# Patient Record
Sex: MALE | Race: WHITE | Employment: FULL TIME | URBAN - METROPOLITAN AREA
[De-identification: names, ages, dates, MRNs, and addresses within clinical notes are randomized per-mention and may not be internally consistent; named-entity substitution may affect disease eponyms.]

---

## 2017-06-30 ENCOUNTER — HOSPITAL ENCOUNTER (OUTPATIENT)
Dept: PHYSICAL THERAPY | Age: 32
Discharge: HOME | End: 2017-06-30
Payer: COMMERCIAL

## 2017-06-30 PROCEDURE — 97530 THERAPEUTIC ACTIVITIES: CPT

## 2017-06-30 PROCEDURE — 97161 PT EVAL LOW COMPLEX 20 MIN: CPT
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
## 2017-07-05 ENCOUNTER — HOSPITAL ENCOUNTER (OUTPATIENT)
Dept: PHYSICAL THERAPY | Age: 32
Discharge: HOME | End: 2017-07-05
Payer: COMMERCIAL

## 2017-07-05 PROCEDURE — 97110 THERAPEUTIC EXERCISES: CPT

## 2017-07-05 PROCEDURE — 97140 MANUAL THERAPY 1/> REGIONS: CPT

## 2017-07-05 PROCEDURE — 97014 ELECTRIC STIMULATION THERAPY: CPT

## 2017-07-05 NOTE — PROGRESS NOTES
PT DAILY TREATMENT NOTE 12-    Patient Name: Fernando Sood  Date:2017  : 1985  [x]  Patient  Verified  Payor: SELF PAY / Plan: [facility] SELF PAY / Product Type: Self Pay /    In time:1800  Out time:1900  Total Treatment Time (min): 60  Visit #: 2 of 12    Treatment Area: Bilateral shoulder pain [M25.511, M25.512]    SUBJECTIVE  Pain Level (0-10 scale): 4-5/10 neck  Any medication changes, allergies to medications, adverse drug reactions, diagnosis change, or new procedure performed?: [x] No    [] Yes (see summary sheet for update)  Subjective functional status/changes:   [] No changes reported  Reviewed eval prior to tx    OBJECTIVE  2  Modality rationale: decrease pain and increase tissue extensibility to improve the patients ability to improve ADL's   Min Type Additional Details   15 [x] Estim:  [x]Unatt       []IFC  [x]Premod  1-10 PPS                      []Other:  []w/ice   [x]w/heat  Position:HL  Location:cervoico-thoracic    [] Estim: []Att    []TENS instruct  []NMES                    []Other:  []w/US   []w/ice   []w/heat  Position:  Location:    []  Traction: [] Cervical       []Lumbar                       [] Prone          []Supine                       []Intermittent   []Continuous Lbs:  [] before manual  [] after manual    []  Ultrasound: []Continuous   [] Pulsed                           []1MHz   []3MHz W/cm2:  Location:    []  Iontophoresis with dexamethasone         Location: [] Take home patch   [] In clinic    []  Ice     []  heat  []  Ice massage  []  Laser   []  Anodyne Position:  Location:    []  Laser with stim  []  Other:  Position:  Location:    []  Vasopneumatic Device Pressure:       [] lo [] med [] hi   Temperature: [] lo [] med [] hi   [x] Skin assessment post-treatment:  [x]intact [x]redness- no adverse reaction    []redness  adverse reaction:      min []Eval                  []Re-Eval       35 min Therapeutic Exercise:  [x] See flow sheet :   Rationale: increase ROM, increase strength and increase proprioception to improve the patients ability to improve ADL's     min Therapeutic Activity:  []  See flow sheet :         min Neuromuscular Re-education:  []  See flow sheet :       10 min Manual Therapy:  SOR. DTM/TPM B UT's   Rationale: decrease pain, increase ROM, increase tissue extensibility and decrease trigger points to improve ADL's     min Gait Training:  ___ feet with ___ device on level surfaces with ___ level of assist   Rationale: With   [] TE   [] TA   [] neuro   [] other: Patient Education: [x] Review HEP    [] Progressed/Changed HEP based on:   [] positioning   [] body mechanics   [] transfers   [x] heat/ice application    [] other:       Other Objective/Functional Measures: see POC     Pain Level (0-10 scale) post treatment: 4/10    ASSESSMENT/Changes in Function:     Patient will continue to benefit from skilled PT services to modify and progress therapeutic interventions, address ROM deficits, address strength deficits, analyze and address soft tissue restrictions, analyze and cue movement patterns, analyze and modify body mechanics/ergonomics and instruct in home and community integration to attain remaining goals. [x]  See Plan of Care  []  See progress note/recertification  []  See Discharge Summary         Progress towards goals / Updated goals:  Short Term Goals: STG- To be accomplished in 2 week(s):  1.  Pt will be independent with HEP to encourage prophylaxis. Eval:dispensed  Current: reviewed stretching      Long Term Goals: LTG- To be accomplished in 6 week(s):  1.  Pt will demonstrate 5/5 shoulder strength with no pain during MMT to allow pt to return to gym activities. Eval:3+ flex, 4- abd  Current: no change      2.  Pt will report pain at worst does not exceed 3/10 with most activities to allow pt to return to work with no restrictions.   Eval:8-9  Current: 4-5/10      3.  Pt cervical spine ROM will improve by 10 deg each direction to allow pt to check mirrors while driving with no increase in pain. Eval:Cervical spine:     Left Right   Flexion 22  -   extension 21  -   rotation 35 50   Side bend 15 20- \"popping\"   Current: no change      4.  Pt FOTO score will increase by 10 points to show improvement in shoulder and neck function.   Eval:72  Current: will address at visit 5      PLAN  [x]  Upgrade activities as tolerated     [x]  Continue plan of care  []  Update interventions per flow sheet       []  Discharge due to:_  []  Other:_      Pascale Graff PTA 7/5/2017  6:13 PM    Future Appointments  Date Time Provider David Stovall   7/7/2017 3:30 PM Renny Norman, PT [facility]   7/12/2017 5:00 PM CHIKA Montgomery [facility]   7/14/2017 3:00 PM CHIKA Montgomery [facility]

## 2017-07-07 ENCOUNTER — HOSPITAL ENCOUNTER (OUTPATIENT)
Dept: PHYSICAL THERAPY | Age: 32
Discharge: HOME | End: 2017-07-07
Payer: COMMERCIAL

## 2017-07-07 PROCEDURE — 97014 ELECTRIC STIMULATION THERAPY: CPT

## 2017-07-07 PROCEDURE — 97110 THERAPEUTIC EXERCISES: CPT

## 2017-07-07 PROCEDURE — 97140 MANUAL THERAPY 1/> REGIONS: CPT

## 2017-07-07 NOTE — PROGRESS NOTES
PT DAILY TREATMENT NOTE 12    Patient Name: Antonietta Velasquez  Date:2017  : 1985  [x]  Patient  Verified  Payor: SELF PAY / Plan: [facility] SELF PAY / Product Type: Self Pay /    In time:313  Out time:407  Total Treatment Time (min): 54  Visit #: 3 of 12    Treatment Area: Bilateral shoulder pain [M25.511, M25.512]    SUBJECTIVE  Pain Level (0-10 scale): 3/10  Any medication changes, allergies to medications, adverse drug reactions, diagnosis change, or new procedure performed?: [x] No    [] Yes (see summary sheet for update)  Subjective functional status/changes:   [] No changes reported  Feels stiff.     OBJECTIVE    Modality rationale: decrease pain and increase tissue extensibility to improve the patients ability to increase activity/position tolerance   Min Type Additional Details   15 with 3 min setup [x] Estim:  [x]Unatt       []IFC  [x]Premod (1-10 pps) upper thoracic region                      []Other:  []w/ice   [x]w/heat  Position:supine  Location:neck and back    [] Estim: []Att    []TENS instruct  []NMES                    []Other:  []w/US   []w/ice   []w/heat  Position:  Location:    []  Traction: [] Cervical       []Lumbar                       [] Prone          []Supine                       []Intermittent   []Continuous Lbs:  [] before manual  [] after manual    []  Ultrasound: []Continuous   [] Pulsed                           []1MHz   []3MHz W/cm2:  Location:    []  Iontophoresis with dexamethasone         Location: [] Take home patch   [] In clinic    []  Ice     []  heat  []  Ice massage  []  Laser   []  Anodyne Position:  Location:    []  Laser with stim  []  Other:  Position:  Location:    []  Vasopneumatic Device Pressure:       [] lo [] med [] hi   Temperature: [] lo [] med [] hi   [] Skin assessment post-treatment:  []intact []redness- no adverse reaction    []redness  adverse reaction:      min []Eval                  []Re-Eval       24 min Therapeutic Exercise:  [x] See flow sheet :   Rationale: increase ROM and increase strength to improve the patients ability to normalize ADL's     min Therapeutic Activity:  []  See flow sheet :         min Neuromuscular Re-education:  []  See flow sheet :       12 min Manual Therapy:  STM and gentle PA mobs to upper thoracic spine in prone, Hubbard MFR technique   Rationale: decrease pain, increase ROM, increase tissue extensibility and decrease trigger points to turn head and tolerate positions     min Gait Training:  ___ feet with ___ device on level surfaces with ___ level of assist   Rationale: With   [] TE   [] TA   [] neuro   [] other: Patient Education: [x] Review HEP    [] Progressed/Changed HEP based on:   [] positioning   [] body mechanics   [] transfers   [] heat/ice application    [] other:      Other Objective/Functional Measures: none taken today     Pain Level (0-10 scale) post treatment: 1/10    ASSESSMENT/Changes in Function: Pain level decreasing. Increased tightness noted in interscap region, left > right. Patient will continue to benefit from skilled PT services to modify and progress therapeutic interventions, address ROM deficits, address strength deficits, analyze and address soft tissue restrictions, analyze and cue movement patterns, analyze and modify body mechanics/ergonomics and assess and modify postural abnormalities to attain remaining goals. []  See Plan of Care  []  See progress note/recertification  []  See Discharge Summary         Progress towards goals / Updated goals:  Short Term Goals: STG- To be accomplished in 2 week(s):  1.  Pt will be independent with HEP to encourage prophylaxis. Eval:dispensed  Current: reviewed stretching      Long Term Goals: LTG- To be accomplished in 6 week(s):  1.  Pt will demonstrate 5/5 shoulder strength with no pain during MMT to allow pt to return to gym activities.   Eval:3+ flex, 4- abd  Current: no change      2.  Pt will report pain at worst does not exceed 3/10 with most activities to allow pt to return to work with no restrictions. Eval:8-9  Current: 4-5/10      3.  Pt cervical spine ROM will improve by 10 deg each direction to allow pt to check mirrors while driving with no increase in pain. Eval:Cervical spine:     Left Right   Flexion 22  -   extension 21  -   rotation 35 50   Side bend 15 20- \"popping\"   Current: no change      4.  Pt FOTO score will increase by 10 points to show improvement in shoulder and neck function.   Eval:72  Current: will address at visit 5            PLAN  [x]  Upgrade activities as tolerated     [x]  Continue plan of care  []  Update interventions per flow sheet       []  Discharge due to:_  []  Other:_      Durenda Habermann, PT 7/7/2017  3:13 PM    Future Appointments  Date Time Provider David Stovall   7/7/2017 3:30 PM Durenda Habermann, PT [facility]   7/12/2017 5:00 PM Jannell Dancer, PTA [facility]   7/14/2017 3:00 PM Jannell Dancer, PTA [facility]

## 2017-07-12 ENCOUNTER — HOSPITAL ENCOUNTER (OUTPATIENT)
Dept: PHYSICAL THERAPY | Age: 32
Discharge: HOME | End: 2017-07-12
Payer: COMMERCIAL

## 2017-07-12 PROCEDURE — 97110 THERAPEUTIC EXERCISES: CPT

## 2017-07-12 PROCEDURE — 97140 MANUAL THERAPY 1/> REGIONS: CPT

## 2017-07-12 PROCEDURE — 97014 ELECTRIC STIMULATION THERAPY: CPT

## 2017-07-12 NOTE — PROGRESS NOTES
PT DAILY TREATMENT NOTE 12    Patient Name: Corinne Stamps  Date:2017  : 1985  [x]  Patient  Verified  Payor: SELF PAY / Plan: [facility] SELF PAY / Product Type: Self Pay /    In time:310  Out time:402  Total Treatment Time (min): 52  Visit #: 4 of 12    Treatment Area: Bilateral shoulder pain [M25.511, M25.512]    SUBJECTIVE  Pain Level (0-10 scale): 0/10  Any medication changes, allergies to medications, adverse drug reactions, diagnosis change, or new procedure performed?: [x] No    [] Yes (see summary sheet for update)  Subjective functional status/changes:   [] No changes reported  Tightness more than anything. It's tender if you touch it in between shoulder blades. I felt relaxed after I left here last time.     OBJECTIVE    Modality rationale: decrease pain and increase tissue extensibility to improve the patients ability to increase activity/position tolerance   Min Type Additional Details   12 with 3 min setup [x] Estim:  []Unatt       []IFC  [x]Premod                        []Other:  []w/ice   [x]w/heat  Position:supine  Location:neck and back    [] Estim: []Att    []TENS instruct  []NMES                    []Other:  []w/US   []w/ice   []w/heat  Position:  Location:    []  Traction: [] Cervical       []Lumbar                       [] Prone          []Supine                       []Intermittent   []Continuous Lbs:  [] before manual  [] after manual    []  Ultrasound: []Continuous   [] Pulsed                           []1MHz   []3MHz W/cm2:  Location:    []  Iontophoresis with dexamethasone         Location: [] Take home patch   [] In clinic    []  Ice     []  heat  []  Ice massage  []  Laser   []  Anodyne Position:  Location:    []  Laser with stim  []  Other:  Position:  Location:    []  Vasopneumatic Device Pressure:       [] lo [] med [] hi   Temperature: [] lo [] med [] hi   [] Skin assessment post-treatment:  []intact []redness- no adverse reaction    []redness  adverse reaction: min []Eval                  []Re-Eval       27 min Therapeutic Exercise:  [x] See flow sheet :   Rationale: increase ROM and increase strength to improve the patients ability to normalize ADL's     min Therapeutic Activity:  []  See flow sheet :         min Neuromuscular Re-education:  []  See flow sheet :       8 min Manual Therapy:  STM and gentle PA mobs to upper thoracic spine in prone, Hubbard MFR technique   Rationale: decrease pain, increase ROM, increase tissue extensibility and decrease trigger points to normalize function     min Gait Training:  ___ feet with ___ device on level surfaces with ___ level of assist   Rationale: With   [] TE   [] TA   [] neuro   [] other: Patient Education: [x] Review HEP    [] Progressed/Changed HEP based on:   [] positioning   [] body mechanics   [] transfers   [] heat/ice application    [] other:      Other Objective/Functional Measures: none taken today     Pain Level (0-10 scale) post treatment: 0/10    ASSESSMENT/Changes in [id number]: Pain continues to improved with symptoms isolate to interscap/rhomboid region. Patient will continue to benefit from skilled PT services to modify and progress therapeutic interventions, address ROM deficits, address strength deficits, analyze and address soft tissue restrictions, analyze and cue movement patterns, analyze and modify body mechanics/ergonomics and assess and modify postural abnormalities to attain remaining goals. []  See Plan of Care  []  See progress note/recertification  []  See Discharge Summary         Progress towards goals / Updated goals:  Short Term Goals: STG- To be accomplished in 2 week(s):  1.  Pt will be independent with HEP to encourage prophylaxis. Eval:dispensed  Current: reviewed stretching      Long Term Goals: LTG- To be accomplished in 6 week(s):  1.  Pt will demonstrate 5/5 shoulder strength with no pain during MMT to allow pt to return to gym activities.   Eval:3+ flex, 4- abd  Current: no change      2.  Pt will report pain at worst does not exceed 3/10 with most activities to allow pt to return to work with no restrictions. Eval:8-9  Current: 4-5/10      3.  Pt cervical spine ROM will improve by 10 deg each direction to allow pt to check mirrors while driving with no increase in pain. Eval:Cervical spine:     Left Right   Flexion 22  -   extension 21  -   rotation 35 50   Side bend 15 20- \"popping\"   Current: no change      4.  Pt FOTO score will increase by 10 points to show improvement in shoulder and neck function.   Eval:72  Current: will address at visit 5          PLAN  [x]  Upgrade activities as tolerated     [x]  Continue plan of care  []  Update interventions per flow sheet       []  Discharge due to:_  [x]  Other: [facility] next visit     Hernandez Gonzalez PT 7/12/2017  3:12 PM    Future Appointments  Date Time Provider David Stovall   7/12/2017 3:30 PM Breana Stark [facility]   7/14/2017 3:00 PM Zenaida Teran PTA [facility]

## 2017-07-14 ENCOUNTER — HOSPITAL ENCOUNTER (OUTPATIENT)
Dept: PHYSICAL THERAPY | Age: 32
Discharge: HOME | End: 2017-07-14
Payer: COMMERCIAL

## 2017-07-14 PROCEDURE — 97014 ELECTRIC STIMULATION THERAPY: CPT

## 2017-07-14 PROCEDURE — 97140 MANUAL THERAPY 1/> REGIONS: CPT

## 2017-07-14 PROCEDURE — 97110 THERAPEUTIC EXERCISES: CPT

## 2017-07-14 NOTE — PROGRESS NOTES
PT DAILY TREATMENT NOTE 12    Patient Name: Justina Baker  Date:2017  : 1985  [x]  Patient  Verified  Payor: SELF PAY / Plan: [facility] SELF PAY / Product Type: Self Pay /    In time:1445  Out time:1544  Total Treatment Time (min): 61  Visit #: 5 of 12    Treatment Area: Bilateral shoulder pain [M25.511, M25.512]    SUBJECTIVE  Pain Level (0-10 scale): 0/10  Any medication changes, allergies to medications, adverse drug reactions, diagnosis change, or new procedure performed?: [x] No    [] Yes (see summary sheet for update)  Subjective functional status/changes:   [] No changes reported  My back just feels so tight. I have been floating in the pool to relax.     OBJECTIVE    Modality rationale: decrease pain and increase tissue extensibility to improve the patients ability to normalize ADL's   Min Type Additional Details   15 [x] Estim:  [x]Unatt       [x]IFC  []Premod                        []Other:  []w/ice   [x]w/heat  Position:supine  Location:C/S through T/S    [] Estim: []Att    []TENS instruct  []NMES                    []Other:  []w/US   []w/ice   []w/heat  Position:  Location:    []  Traction: [] Cervical       []Lumbar                       [] Prone          []Supine                       []Intermittent   []Continuous Lbs:  [] before manual  [] after manual    []  Ultrasound: []Continuous   [] Pulsed                           []1MHz   []3MHz W/cm2:  Location:    []  Iontophoresis with dexamethasone         Location: [] Take home patch   [] In clinic    []  Ice     []  heat  []  Ice massage  []  Laser   []  Anodyne Position:  Location:    []  Laser with stim  []  Other:  Position:  Location:    []  Vasopneumatic Device Pressure:       [] lo [] med [] hi   Temperature: [] lo [] med [] hi   [] Skin assessment post-treatment:  []intact []redness- no adverse reaction    []redness  adverse reaction:      min []Eval                  []Re-Eval       34 min Therapeutic Exercise:  [x] See flow sheet :   Rationale: increase ROM and increase strength to improve the patients ability to normalize ADL's     min Therapeutic Activity:  []  See flow sheet :         min Neuromuscular Re-education:  []  See flow sheet :       10 min Manual Therapy:  AP mobs thoracic spine in prone, DTM/TPM B rhomboid left>right   Rationale: decrease pain, increase ROM, increase tissue extensibility and decrease trigger points to normalize ADL's     min Gait Training:  ___ feet with ___ device on level surfaces with ___ level of assist   Rationale: With   [] TE   [] TA   [] neuro   [] other: Patient Education: [x] Review HEP    [] Progressed/Changed HEP based on:   [] positioning   [] body mechanics   [] transfers   [] heat/ice application    [] other:      Other Objective/Functional Measures: FOTO: 87/100     Pain Level (0-10 scale) post treatment: 0/10    ASSESSMENT/Changes in Function: Pt presents with B rhomboid trigger points although mm tightness is significantly improved since [facility] with pain resolving. Patient will continue to benefit from skilled PT services to modify and progress therapeutic interventions, address ROM deficits, address strength deficits, analyze and address soft tissue restrictions, analyze and cue movement patterns, analyze and modify body mechanics/ergonomics and instruct in home and community integration to attain remaining goals. [x]  See Plan of Care  []  See progress note/recertification  []  See Discharge Summary         Progress towards goals / Updated goals:  Short Term Goals: STG- To be accomplished in 2 week(s):  1.  Pt will be independent with HEP to encourage prophylaxis. Eval:dispensed  Current: reviewed stretching      Long Term Goals: LTG- To be accomplished in 6 week(s):  1.  Pt will demonstrate 5/5 shoulder strength with no pain during MMT to allow pt to return to gym activities.   Eval:3+ flex, 4- abd  Current: no change      2.  Pt will report pain at worst does not exceed 3/10 with most activities to allow pt to return to work with no restrictions. Eval:8-9  Current: 0-1/10      3.  Pt cervical spine ROM will improve by 10 deg each direction to allow pt to check mirrors while driving with no increase in pain. Eval:Cervical spine:     Left Right   Flexion 22  -   extension 21  -   rotation 35 50   Side bend 15 20- \"popping\"   Current: no change      4.  Pt FOTO score will increase by 10 points to show improvement in shoulder and neck function.   Eval:72  Current:87/100              PLAN  [x]  Upgrade activities as tolerated     [x]  Continue plan of care  []  Update interventions per flow sheet       []  Discharge due to:_  []  Other:_      Christina Emerson PTA 7/14/2017  2:45 PM    Future Appointments  Date Time Provider David Stovall   7/14/2017 3:00 PM [facility]

## 2017-07-19 ENCOUNTER — HOSPITAL ENCOUNTER (OUTPATIENT)
Dept: PHYSICAL THERAPY | Age: 32
Discharge: HOME | End: 2017-07-19
Payer: COMMERCIAL

## 2017-07-19 PROCEDURE — 97014 ELECTRIC STIMULATION THERAPY: CPT

## 2017-07-19 PROCEDURE — 97110 THERAPEUTIC EXERCISES: CPT

## 2017-07-19 NOTE — PROGRESS NOTES
PT DAILY TREATMENT NOTE     Patient Name: Myrna Barker  Date:2017  : 1985  [x]  Patient  Verified  Payor: SELF PAY / Plan: [facility] SELF PAY / Product Type: Self Pay /    In time:1650  Out time:1740  Total Treatment Time (min): 50  Visit #: 6 of 12    Treatment Area: Bilateral shoulder pain [M25.511, M25.512]    SUBJECTIVE  Pain Level (0-10 scale): 0/10  Any medication changes, allergies to medications, adverse drug reactions, diagnosis change, or new procedure performed?: [x] No    [] Yes (see summary sheet for update)  Subjective functional status/changes:   [] No changes reported  I have been using the foam roller a lot. My back just gets tight by the end of the day but it is improving.     OBJECTIVE    Modality rationale: decrease inflammation, decrease pain and increase tissue extensibility to improve the patients ability to normalize ADL's   Min Type Additional Details   15 [x] Estim:  [x]Unatt       []IFC  [x]Premod                        []Other:  []w/ice   [x]w/heat  Position:supine  Location:C/S through L/S    [] Estim: []Att    []TENS instruct  []NMES                    []Other:  []w/US   []w/ice   []w/heat  Position:  Location:    []  Traction: [] Cervical       []Lumbar                       [] Prone          []Supine                       []Intermittent   []Continuous Lbs:  [] before manual  [] after manual    []  Ultrasound: []Continuous   [] Pulsed                           []1MHz   []3MHz W/cm2:  Location:    []  Iontophoresis with dexamethasone         Location: [] Take home patch   [] In clinic    []  Ice     []  heat  []  Ice massage  []  Laser   []  Anodyne Position:  Location:    []  Laser with stim  []  Other:  Position:  Location:    []  Vasopneumatic Device Pressure:       [] lo [] med [] hi   Temperature: [] lo [] med [] hi   [] Skin assessment post-treatment:  []intact []redness- no adverse reaction    []redness  adverse reaction:      min []Eval []Re-Eval       35 min Therapeutic Exercise:  [x] See flow sheet :   Rationale: increase ROM, increase strength and increase proprioception to improve the patients ability to normalize function     min Therapeutic Activity:  []  See flow sheet :         min Neuromuscular Re-education:  []  See flow sheet :        min Manual Therapy:          min Gait Training:  ___ feet with ___ device on level surfaces with ___ level of assist   Rationale: With   [] TE   [] TA   [] neuro   [] other: Patient Education: [x] Review HEP    [] Progressed/Changed HEP based on:   [] positioning   [] body mechanics   [] transfers   [] heat/ice application    [] other:      Other Objective/Functional Measures:      Pain Level (0-10 scale) post treatment: 0/10    ASSESSMENT/Changes in Function: MM tightness persists with pt rolling for self management and responding well to e-stim. Patient will continue to benefit from skilled PT services to modify and progress therapeutic interventions, address ROM deficits, address strength deficits, analyze and address soft tissue restrictions, analyze and cue movement patterns, analyze and modify body mechanics/ergonomics and instruct in home and community integration to attain remaining goals. [x]  See Plan of Care  []  See progress note/recertification  []  See Discharge Summary         Progress towards goals / Updated goals:  Short Term Goals: STG- To be accomplished in 2 week(s):  1.  Pt will be independent with HEP to encourage prophylaxis. Eval:dispensed  Current: reviewed stretching      Long Term Goals: LTG- To be accomplished in 6 week(s):  1.  Pt will demonstrate 5/5 shoulder strength with no pain during MMT to allow pt to return to gym activities. Eval:3+ flex, 4- abd  Current: no change      2.  Pt will report pain at worst does not exceed 3/10 with most activities to allow pt to return to work with no restrictions.   Eval:8-9  Current: 0-1/10      3.  Pt cervical spine ROM will improve by 10 deg each direction to allow pt to check mirrors while driving with no increase in pain. Eval:Cervical spine:     Left Right   Flexion 22  -   extension 21  -   rotation 35 50   Side bend 15 20- \"popping\"   Current: no change      4.  Pt FOTO score will increase by 10 points to show improvement in shoulder and neck function.   Eval:72  Current:87/100               PLAN  []  Upgrade activities as tolerated     []  Continue plan of care  []  Update interventions per flow sheet       []  Discharge due to:_  []  Other:_      All Meza PTA 7/19/2017  4:47 PM    Future Appointments  Date Time Provider David Stovall   7/19/2017 5:00 PM CHIKA Mccall [facility]   7/21/2017 2:30 PM CHIKA Mccall [facility]   7/26/2017 12:00 PM CHIKA Mccall [facility]   7/28/2017 1:30 PM STUART Foster [facility]

## 2017-07-26 ENCOUNTER — HOSPITAL ENCOUNTER (OUTPATIENT)
Dept: PHYSICAL THERAPY | Age: 32
Discharge: HOME | End: 2017-07-26
Payer: COMMERCIAL

## 2017-07-26 PROCEDURE — 97110 THERAPEUTIC EXERCISES: CPT

## 2017-07-26 PROCEDURE — 97140 MANUAL THERAPY 1/> REGIONS: CPT

## 2017-07-26 NOTE — PROGRESS NOTES
PT DAILY TREATMENT NOTE 12    Patient Name: Miya Alejo  Date:2017  : 1985  [x]  Patient  Verified  Payor: SELF PAY / Plan: [facility] SELF PAY / Product Type: Self Pay /    In time:1200  Out time:1246  Total Treatment Time (min): 46  Visit #: 8 of 12    Treatment Area: Bilateral shoulder pain [M25.511, M25.512]    SUBJECTIVE  Pain Level (0-10 scale): 0/10  Any medication changes, allergies to medications, adverse drug reactions, diagnosis change, or new procedure performed?: [x] No    [] Yes (see summary sheet for update)  Subjective functional status/changes:   [] No changes reported  Just some tightness but no pain.     OBJECTIVE     Modality rationale: increase tissue extensibility to improve the patients ability to normalize function   Min Type Additional Details    [] Estim:  []Unatt       []IFC  []Premod                        []Other:  []w/ice   []w/heat  Position:  Location:    [] Estim: []Att    []TENS instruct  []NMES                    []Other:  []w/US   []w/ice   []w/heat  Position:  Location:    []  Traction: [] Cervical       []Lumbar                       [] Prone          []Supine                       []Intermittent   []Continuous Lbs:  [] before manual  [] after manual    []  Ultrasound: []Continuous   [] Pulsed                           []1MHz   []3MHz W/cm2:  Location:    []  Iontophoresis with dexamethasone         Location: [] Take home patch   [] In clinic   10 []  Ice     [x]  heat  []  Ice massage  []  Laser   []  Anodyne Position:supine  Location:T/S through L/S    []  Laser with stim  []  Other:  Position:  Location:    []  Vasopneumatic Device Pressure:       [] lo [] med [] hi   Temperature: [] lo [] med [] hi   [x] Skin assessment post-treatment:  [x]intact []redness- no adverse reaction    []redness  adverse reaction:      min []Eval                  []Re-Eval       26 min Therapeutic Exercise:  [x] See flow sheet :   Rationale: increase ROM and increase strength to improve the patients ability to normalize function     min Therapeutic Activity:  []  See flow sheet :         min Neuromuscular Re-education:  []  See flow sheet :       10 min Manual Therapy: AP thoracic mobs with DTM B T/S paraspinals    Rationale: decrease pain, increase ROM and increase tissue extensibility to improve ADl's        min Gait Training:  ___ feet with ___ device on level surfaces with ___ level of assist   Rationale: With   [] TE   [] TA   [] neuro   [] other: Patient Education: [x] Review HEP    [] Progressed/Changed HEP based on:   [] positioning   [] body mechanics   [] transfers   [] heat/ice application    [] other:      Other Objective/Functional Measures: see goal review     Pain Level (0-10 scale) post treatment: 0/10    ASSESSMENT/Changes in Function: UE strength and CROM has returned to WNL's without pain. Patient will continue to benefit from skilled PT services to modify and progress therapeutic interventions, analyze and address soft tissue restrictions, analyze and cue movement patterns, analyze and modify body mechanics/ergonomics and instruct in home and community integration to attain remaining goals. [x]  See Plan of Care  []  See progress note/recertification  []  See Discharge Summary         Progress towards goals / Updated goals:  Short Term Goals: STG- To be accomplished in 2 week(s):  1.  Pt will be independent with HEP to encourage prophylaxis. Eval:dispensed  Current: MET      Long Term Goals: LTG- To be accomplished in 6 week(s):  1.  Pt will demonstrate 5/5 shoulder strength with no pain during MMT to allow pt to return to gym activities. Eval:3+ flex, 4- abd  Current: B sh strength 5/5 all mm groups with pt ready to return to prior gym routine.      2.  Pt will report pain at worst does not exceed 3/10 with most activities to allow pt to return to work with no restrictions.   Eval:8-9  Current: 0-1/10      3.  Pt cervical spine ROM will improve by 10 deg each direction to allow pt to check mirrors while driving with no increase in pain. Eval:Cervical spine:     Left Right   Flexion 22  -   extension 21  -   rotation 35 50   Side bend 15 20- \"popping\"   Current: CROM flexion:70, ext:78, Right rot: 66, Left Rot:63,  Right lat bend:38, Left lateral bend: 34      4.  Pt FOTO score will increase by 10 points to show improvement in shoulder and neck function. Eval:72  Current:87/100       PLAN  []  Upgrade activities as tolerated     []  Continue plan of care  []  Update interventions per flow sheet       []  Discharge due to:_  [x]  Other:_D/C next visit.       Juanjo Gutierrez PTA 7/26/2017  12:02 PM    Future Appointments  Date Time Provider David Stovall   7/28/2017 1:30 PM Tod Zelaya, PT [facility]

## 2017-07-28 ENCOUNTER — HOSPITAL ENCOUNTER (OUTPATIENT)
Dept: PHYSICAL THERAPY | Age: 32
Discharge: HOME | End: 2017-07-28
Payer: COMMERCIAL

## 2017-07-28 PROCEDURE — 97110 THERAPEUTIC EXERCISES: CPT

## 2017-07-28 NOTE — PROGRESS NOTES
PT DAILY TREATMENT NOTE 12    Patient Name: Kevin Cardenas  Date:2017  : 1985  [x]  Patient  Verified  Payor: SELF PAY / Plan: [facility] SELF PAY / Product Type: Self Pay /    In time:105  Out time:130  Total Treatment Time (min): 25  Visit #: 9 of 12    Treatment Area: Bilateral shoulder pain [M25.511, M25.512]    SUBJECTIVE  Pain Level (0-10 scale): 0/10  Any medication changes, allergies to medications, adverse drug reactions, diagnosis change, or new procedure performed?: [x] No    [] Yes (see summary sheet for update)  Subjective functional status/changes:   [] No changes reported  95% improved from [facility]     OBJECTIVE    Modality rationale:    Min Type Additional Details    [] Estim:  []Unatt       []IFC  []Premod                        []Other:  []w/ice   []w/heat  Position:  Location:    [] Estim: []Att    []TENS instruct  []NMES                    []Other:  []w/US   []w/ice   []w/heat  Position:  Location:    []  Traction: [] Cervical       []Lumbar                       [] Prone          []Supine                       []Intermittent   []Continuous Lbs:  [] before manual  [] after manual    []  Ultrasound: []Continuous   [] Pulsed                           []1MHz   []3MHz W/cm2:  Location:    []  Iontophoresis with dexamethasone         Location: [] Take home patch   [] In clinic    []  Ice     []  heat  []  Ice massage  []  Laser   []  Anodyne Position:  Location:    []  Laser with stim  []  Other:  Position:  Location:    []  Vasopneumatic Device Pressure:       [] lo [] med [] hi   Temperature: [] lo [] med [] hi   [] Skin assessment post-treatment:  []intact []redness- no adverse reaction    []redness  adverse reaction:      min []Eval                  []Re-Eval       25 min Therapeutic Exercise:  [x] See flow sheet :   Rationale: increase ROM and increase strength to improve the patients ability to normalize function     min Therapeutic Activity:  []  See flow sheet :         min Neuromuscular Re-education:  []  See flow sheet :        min Manual Therapy:          min Gait Training:  ___ feet with ___ device on level surfaces with ___ level of assist   Rationale: With   [] TE   [] TA   [] neuro   [] other: Patient Education: [x] Review HEP    [] Progressed/Changed HEP based on:   [] positioning   [] body mechanics   [] transfers   [] heat/ice application    [] other:      Other Objective/Functional Measures: see discharge summary     Pain Level (0-10 scale) post treatment: 0/10    ASSESSMENT/Changes in Function: Pt ready to continue on own with self-monitored exercise at this time with plans to return to gym next week. []  See Plan of Care  []  See progress note/recertification  [x]  See Discharge Summary         Progress towards goals / Updated goals:  Short Term Goals: STG- To be accomplished in 2 week(s):  1.  Pt will be independent with HEP to encourage prophylaxis. Eval:dispensed  Current: MET      Long Term Goals: LTG- To be accomplished in 6 week(s):  1.  Pt will demonstrate 5/5 shoulder strength with no pain during MMT to allow pt to return to gym activities. Eval:3+ flex, 4- abd  Current: MET: B sh strength 5/5 all mm groups with pt ready to return to prior gym routine.      2.  Pt will report pain at worst does not exceed 3/10 with most activities to allow pt to return to work with no restrictions. Eval:8-9  Current: MET: 0-1/10      3.  Pt cervical spine ROM will improve by 10 deg each direction to allow pt to check mirrors while driving with no increase in pain. Eval:Cervical spine:     Left Right   Flexion 22  -   extension 21  -   rotation 35 50   Side bend 15 20- \"popping\"   Current: MET: CROM flexion:70, ext:78, Right rot: 66, Left Rot:63,  Right lat bend:38, Left lateral bend: 34      4.  Pt FOTO score will increase by 10 points to show improvement in shoulder and neck function.   Eval:72  Current:MET: 87/100          PLAN  []  Upgrade activities as tolerated     []  Continue plan of care  []  Update interventions per flow sheet       [x]  Discharge due to: meeting of goals   []  Other:_      Viviana Villarreal PT 7/28/2017  1:07 PM    Future Appointments  Date Time Provider David Stovall   7/28/2017 1:30 PM Viviana Villarreal PT [facility]
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
